# Patient Record
Sex: FEMALE | Race: WHITE | NOT HISPANIC OR LATINO | Employment: OTHER | URBAN - NONMETROPOLITAN AREA
[De-identification: names, ages, dates, MRNs, and addresses within clinical notes are randomized per-mention and may not be internally consistent; named-entity substitution may affect disease eponyms.]

---

## 2021-10-29 ENCOUNTER — OFFICE VISIT (OUTPATIENT)
Dept: URGENT CARE | Facility: CLINIC | Age: 76
End: 2021-10-29
Payer: MEDICARE

## 2021-10-29 VITALS
DIASTOLIC BLOOD PRESSURE: 84 MMHG | HEART RATE: 79 BPM | OXYGEN SATURATION: 97 % | RESPIRATION RATE: 14 BRPM | TEMPERATURE: 97.9 F | WEIGHT: 133 LBS | SYSTOLIC BLOOD PRESSURE: 146 MMHG

## 2021-10-29 DIAGNOSIS — H10.33 ACUTE BACTERIAL CONJUNCTIVITIS OF BOTH EYES: ICD-10-CM

## 2021-10-29 PROCEDURE — 99203 OFFICE O/P NEW LOW 30 MIN: CPT | Performed by: PHYSICIAN ASSISTANT

## 2021-10-29 RX ORDER — FLUTICASONE PROPIONATE 50 MCG
SPRAY, SUSPENSION (ML) NASAL
COMMUNITY

## 2021-10-29 RX ORDER — HYDROCODONE BITARTRATE AND ACETAMINOPHEN 5; 325 MG/1; MG/1
1 TABLET ORAL 2 TIMES DAILY
COMMUNITY

## 2021-10-29 RX ORDER — GUAIFENESIN 600 MG/1
600 TABLET, EXTENDED RELEASE ORAL
COMMUNITY

## 2021-10-29 RX ORDER — CLONAZEPAM 1 MG/1
1 TABLET ORAL
COMMUNITY
End: 2021-10-29

## 2021-10-29 RX ORDER — ATORVASTATIN CALCIUM 40 MG/1
40 TABLET, FILM COATED ORAL DAILY
COMMUNITY

## 2021-10-29 RX ORDER — UREA 10 %
400 LOTION (ML) TOPICAL DAILY
COMMUNITY
End: 2021-10-29

## 2021-10-29 RX ORDER — OFLOXACIN 3 MG/ML
1 SOLUTION/ DROPS OPHTHALMIC 4 TIMES DAILY
Qty: 10 ML | Refills: 0 | Status: SHIPPED | OUTPATIENT
Start: 2021-10-29 | End: 2021-11-05

## 2021-10-29 RX ORDER — CEPHALEXIN 500 MG/1
500 CAPSULE ORAL
COMMUNITY
End: 2021-10-29

## 2021-10-29 RX ORDER — TRAMADOL HYDROCHLORIDE 50 MG/1
50 TABLET ORAL
COMMUNITY
End: 2021-10-29

## 2021-10-29 RX ORDER — NIFEDIPINE 10 MG/1
CAPSULE ORAL
COMMUNITY

## 2021-10-29 RX ORDER — AMLODIPINE BESYLATE 5 MG/1
TABLET ORAL
COMMUNITY

## 2021-10-29 RX ORDER — LEVOTHYROXINE SODIUM 0.05 MG/1
TABLET ORAL
COMMUNITY
End: 2021-10-29

## 2021-10-29 RX ORDER — ZOLPIDEM TARTRATE 10 MG/1
TABLET ORAL
COMMUNITY
Start: 2021-10-26

## 2021-10-29 RX ORDER — OMEPRAZOLE 20 MG/1
1 CAPSULE, DELAYED RELEASE ORAL
COMMUNITY

## 2021-10-29 RX ORDER — SPIRONOLACTONE 25 MG/1
1 TABLET ORAL DAILY
COMMUNITY
End: 2021-10-29

## 2021-10-29 RX ORDER — COLESEVELAM 180 1/1
3125 TABLET ORAL DAILY
COMMUNITY
End: 2021-10-29

## 2021-10-29 RX ORDER — ONDANSETRON 4 MG/1
4 TABLET, ORALLY DISINTEGRATING ORAL
COMMUNITY
Start: 2021-08-12

## 2021-10-29 RX ORDER — HYDROCORTISONE 10 MG/G
CREAM TOPICAL
COMMUNITY
Start: 2021-09-01

## 2021-10-29 RX ORDER — CARVEDILOL PHOSPHATE 80 MG/1
80 CAPSULE, EXTENDED RELEASE ORAL DAILY
COMMUNITY
End: 2021-10-29

## 2021-10-29 RX ORDER — METHOCARBAMOL 750 MG/1
750 TABLET, FILM COATED ORAL
COMMUNITY
End: 2021-10-29

## 2021-10-29 RX ORDER — ASPIRIN 325 MG
TABLET ORAL
COMMUNITY
End: 2021-10-29

## 2021-10-29 RX ORDER — SPIRONOLACTONE AND HYDROCHLOROTHIAZIDE 25; 25 MG/1; MG/1
1 TABLET ORAL DAILY
COMMUNITY
End: 2021-10-29

## 2021-10-29 RX ORDER — ASPIRIN 81 MG/1
TABLET ORAL
COMMUNITY

## 2021-10-29 RX ORDER — HYDROCODONE BITARTRATE AND ACETAMINOPHEN 10; 325 MG/1; MG/1
TABLET ORAL
COMMUNITY
End: 2021-10-29

## 2021-10-29 RX ORDER — POTASSIUM CHLORIDE 20 MEQ/1
TABLET, EXTENDED RELEASE ORAL
COMMUNITY

## 2021-10-29 RX ORDER — CARVEDILOL 25 MG/1
25 TABLET ORAL 2 TIMES DAILY WITH MEALS
COMMUNITY

## 2021-10-29 ASSESSMENT — ENCOUNTER SYMPTOMS
EYE REDNESS: 1
BLURRED VISION: 1
PHOTOPHOBIA: 0
EYE DISCHARGE: 1
EYE PAIN: 0
DOUBLE VISION: 0

## 2021-10-29 ASSESSMENT — VISUAL ACUITY: OU: 1

## 2021-10-30 NOTE — PROGRESS NOTES
Subjective:   Moe Roper is a 76 y.o. female who presents today with   Chief Complaint   Patient presents with   • Eye Problem     EYE INFECTION, ITCHING, BURNING X2 DAYS       Eye Problem   Both eyes are affected.This is a new problem. Episode onset: 2 days. The problem occurs constantly. The problem has been unchanged. There was no injury mechanism. The pain is mild. There is no known exposure to pink eye. She does not wear contacts. Associated symptoms include blurred vision, an eye discharge and eye redness. Pertinent negatives include no double vision or photophobia. She has tried commercial eye wash for the symptoms. The treatment provided no relief.     Patient states the right eye has been more irritated than the left but the left seems to have gotten more irritated as the day progressed today.  She states this morning she woke up with significant discharge from the eyes.  Denies any recent injury or trauma and does not wear contacts.  Patient states this feels identical to when she has had conjunctivitis in the past.  PMH:  has no past medical history on file.  MEDS:   Current Outpatient Medications:   •  amLODIPine (NORVASC) 5 MG Tab, 1 tab(s), Disp: , Rfl:   •  aspirin 81 MG EC tablet, 1 tab(s) orally once a day, Disp: , Rfl:   •  atorvastatin (LIPITOR) 40 MG Tab, Take 40 mg by mouth every day., Disp: , Rfl:   •  carvedilol (COREG) 25 MG Tab, Take 25 mg by mouth 2 times a day with meals., Disp: , Rfl:   •  zolpidem (AMBIEN) 10 MG Tab, 1 tab(s), Disp: , Rfl:   •  potassium chloride SA (KLOR-CON M20) 20 MEQ Tab CR, 1 tab(s), Disp: , Rfl:   •  ondansetron (ZOFRAN ODT) 4 MG TABLET DISPERSIBLE, Take 4 mg by mouth., Disp: , Rfl:   •  omeprazole (PRILOSEC) 20 MG delayed-release capsule, 1 Capsule., Disp: , Rfl:   •  NIFEdipine IR (PROCARDIA) 10 MG Cap, 1 cap(s), Disp: , Rfl:   •  Hydrocortisone, Perianal, 1 % Cream, Apply  topically., Disp: , Rfl:   •  HYDROcodone-acetaminophen (NORCO) 5-325 MG Tab per  tablet, Take 1 Tablet by mouth 2 times a day., Disp: , Rfl:   •  guaiFENesin ER (MUCINEX) 600 MG TABLET SR 12 HR, Take 600 mg by mouth., Disp: , Rfl:   •  fluticasone (FLONASE) 50 MCG/ACT nasal spray, 1 spray in each nostril Nasally Once a day for 30 day(s), Disp: , Rfl:   •  cholecalciferol (VITAMIN D3) 5000 UNIT Cap, Take 5,000 Units by mouth., Disp: , Rfl:   •  ofloxacin (OCUFLOX) 0.3 % Solution, Administer 1 Drop into both eyes 4 times a day for 7 days., Disp: 10 mL, Rfl: 0  ALLERGIES:   Allergies   Allergen Reactions   • Celecoxib Rash     Rash       SURGHX: No past surgical history on file.  SOCHX:  does not have a smoking history on file. She has never used smokeless tobacco. She reports current alcohol use of about 1.2 oz of alcohol per week. She reports current drug use.  FH: Reviewed with patient, not pertinent to this visit.       Review of Systems   Eyes: Positive for blurred vision, discharge and redness. Negative for double vision, photophobia and pain.        Bilateral eye irritation        Objective:   /84 (BP Location: Right arm, Patient Position: Sitting, BP Cuff Size: Adult)   Pulse 79   Temp 36.6 °C (97.9 °F) (Temporal)   Resp 14   Wt 60.3 kg (133 lb)   SpO2 97%   Physical Exam  Vitals and nursing note reviewed.   Constitutional:       General: She is not in acute distress.     Appearance: Normal appearance. She is well-developed.   HENT:      Head: Normocephalic and atraumatic.      Right Ear: Hearing normal.      Left Ear: Hearing normal.   Eyes:      General: Vision grossly intact.         Right eye: Discharge present. No foreign body.         Left eye: Discharge present.No foreign body.      Conjunctiva/sclera:      Right eye: Right conjunctiva is injected.      Left eye: Left conjunctiva is injected.      Pupils: Pupils are equal, round, and reactive to light.      Comments: No uptake and normal exam with fluorescein and Woods lamp.   Cardiovascular:      Rate and Rhythm: Normal  rate and regular rhythm.      Heart sounds: Normal heart sounds.   Pulmonary:      Effort: Pulmonary effort is normal.      Breath sounds: Normal breath sounds.   Musculoskeletal:      Comments: Normal movement in all 4 extremities   Skin:     General: Skin is warm and dry.   Neurological:      Mental Status: She is alert.      Coordination: Coordination normal.   Psychiatric:         Mood and Affect: Mood normal.         VISUAL ACUITY  R 20/70  L 20/40  BOTH 20/40    Assessment/Plan:   Assessment    1. Acute bacterial conjunctivitis of both eyes  - ofloxacin (OCUFLOX) 0.3 % Solution; Administer 1 Drop into both eyes 4 times a day for 7 days.  Dispense: 10 mL; Refill: 0    Other orders  - amLODIPine (NORVASC) 5 MG Tab; 1 tab(s)  - aspirin 81 MG EC tablet; 1 tab(s) orally once a day  - atorvastatin (LIPITOR) 40 MG Tab; Take 40 mg by mouth every day.  - carvedilol (COREG) 25 MG Tab; Take 25 mg by mouth 2 times a day with meals.  - zolpidem (AMBIEN) 10 MG Tab; 1 tab(s)  - potassium chloride SA (KLOR-CON M20) 20 MEQ Tab CR; 1 tab(s)  - ondansetron (ZOFRAN ODT) 4 MG TABLET DISPERSIBLE; Take 4 mg by mouth.  - omeprazole (PRILOSEC) 20 MG delayed-release capsule; 1 Capsule.  - NIFEdipine IR (PROCARDIA) 10 MG Cap; 1 cap(s)  - Hydrocortisone, Perianal, 1 % Cream; Apply  topically.  - HYDROcodone-acetaminophen (NORCO) 5-325 MG Tab per tablet; Take 1 Tablet by mouth 2 times a day.  - guaiFENesin ER (MUCINEX) 600 MG TABLET SR 12 HR; Take 600 mg by mouth.  - fluticasone (FLONASE) 50 MCG/ACT nasal spray; 1 spray in each nostril Nasally Once a day for 30 day(s)  - cholecalciferol (VITAMIN D3) 5000 UNIT Cap; Take 5,000 Units by mouth.  Symptoms and presentation most consistent with conjunctivitis of both eyes at this time and will treat accordingly with antibiotic drops.  No deficit appreciated on exam.  Slightly worse visual acuity in the right eye but this is consistent with the symptoms she is having as she states it feels more  irritated and more discharge than the left eye.  Recommend that she follow-up with eye specialist if any new vision changes occur and she is agreeable with this.  Differential diagnosis, natural history, supportive care, and indications for immediate follow-up discussed.   Patient given instructions and understanding of medications and treatment.    If not improving in 3-5 days, F/U with PCP or return to  if symptoms worsen.    Patient agreeable to plan.      Please note that this dictation was created using voice recognition software. I have made every reasonable attempt to correct obvious errors, but I expect that there are errors of grammar and possibly content that I did not discover before finalizing the note.    Oumar Patel PA-C